# Patient Record
Sex: MALE | Race: WHITE | Employment: OTHER | URBAN - METROPOLITAN AREA
[De-identification: names, ages, dates, MRNs, and addresses within clinical notes are randomized per-mention and may not be internally consistent; named-entity substitution may affect disease eponyms.]

---

## 2019-08-15 ENCOUNTER — APPOINTMENT (OUTPATIENT)
Dept: CT IMAGING | Facility: CLINIC | Age: 64
End: 2019-08-15
Payer: MEDICARE

## 2019-08-15 ENCOUNTER — OFFICE VISIT (OUTPATIENT)
Dept: FAMILY MEDICINE | Facility: CLINIC | Age: 64
End: 2019-08-15
Payer: MEDICARE

## 2019-08-15 ENCOUNTER — APPOINTMENT (OUTPATIENT)
Dept: GENERAL RADIOLOGY | Facility: CLINIC | Age: 64
End: 2019-08-15
Payer: MEDICARE

## 2019-08-15 ENCOUNTER — ALLIED HEALTH/NURSE VISIT (OUTPATIENT)
Dept: NURSING | Facility: CLINIC | Age: 64
End: 2019-08-15
Payer: MEDICARE

## 2019-08-15 ENCOUNTER — HOSPITAL ENCOUNTER (OUTPATIENT)
Facility: CLINIC | Age: 64
Setting detail: OBSERVATION
Discharge: HOME | End: 2019-08-16
Attending: EMERGENCY MEDICINE | Admitting: SURGERY
Payer: MEDICARE

## 2019-08-15 VITALS
TEMPERATURE: 97.8 F | OXYGEN SATURATION: 98 % | SYSTOLIC BLOOD PRESSURE: 145 MMHG | HEART RATE: 52 BPM | DIASTOLIC BLOOD PRESSURE: 67 MMHG

## 2019-08-15 VITALS
OXYGEN SATURATION: 99 % | TEMPERATURE: 97.8 F | DIASTOLIC BLOOD PRESSURE: 76 MMHG | SYSTOLIC BLOOD PRESSURE: 167 MMHG | HEART RATE: 47 BPM

## 2019-08-15 DIAGNOSIS — R07.9 ACUTE CHEST PAIN: Primary | ICD-10-CM

## 2019-08-15 DIAGNOSIS — R10.9 LEFT FLANK PAIN: ICD-10-CM

## 2019-08-15 DIAGNOSIS — R58 INTRAABDOMINAL HEMORRHAGE: ICD-10-CM

## 2019-08-15 DIAGNOSIS — Z87.442 HISTORY OF KIDNEY STONES: ICD-10-CM

## 2019-08-15 DIAGNOSIS — R91.8 PULMONARY NODULES: ICD-10-CM

## 2019-08-15 DIAGNOSIS — E27.8 ADRENAL MASS (H): ICD-10-CM

## 2019-08-15 DIAGNOSIS — R07.9 CHEST PAIN: Primary | ICD-10-CM

## 2019-08-15 LAB
ALBUMIN UR-MCNC: NEGATIVE MG/DL
ANION GAP SERPL CALCULATED.3IONS-SCNC: 3 MMOL/L (ref 3–14)
APPEARANCE UR: CLEAR
BASOPHILS # BLD AUTO: 0 10E9/L (ref 0–0.2)
BASOPHILS NFR BLD AUTO: 0.1 %
BILIRUB UR QL STRIP: NEGATIVE
BUN SERPL-MCNC: 15 MG/DL (ref 7–30)
CALCIUM SERPL-MCNC: 8.6 MG/DL (ref 8.5–10.1)
CHLORIDE SERPL-SCNC: 109 MMOL/L (ref 94–109)
CO2 SERPL-SCNC: 29 MMOL/L (ref 20–32)
COLOR UR AUTO: YELLOW
CREAT SERPL-MCNC: 0.89 MG/DL (ref 0.66–1.25)
DIFFERENTIAL METHOD BLD: ABNORMAL
EOSINOPHIL # BLD AUTO: 0 10E9/L (ref 0–0.7)
EOSINOPHIL NFR BLD AUTO: 0.3 %
ERYTHROCYTE [DISTWIDTH] IN BLOOD BY AUTOMATED COUNT: 14.4 % (ref 10–15)
GFR SERPL CREATININE-BSD FRML MDRD: 89 ML/MIN/{1.73_M2}
GLUCOSE SERPL-MCNC: 108 MG/DL (ref 70–99)
GLUCOSE UR STRIP-MCNC: NEGATIVE MG/DL
HCT VFR BLD AUTO: 42.1 % (ref 40–53)
HGB BLD-MCNC: 14.2 G/DL (ref 13.3–17.7)
HGB UR QL STRIP: NEGATIVE
IMM GRANULOCYTES # BLD: 0 10E9/L (ref 0–0.4)
IMM GRANULOCYTES NFR BLD: 0.3 %
INTERPRETATION ECG - MUSE: NORMAL
KETONES UR STRIP-MCNC: 5 MG/DL
LEUKOCYTE ESTERASE UR QL STRIP: NEGATIVE
LYMPHOCYTES # BLD AUTO: 1.1 10E9/L (ref 0.8–5.3)
LYMPHOCYTES NFR BLD AUTO: 10.8 %
MCH RBC QN AUTO: 32.4 PG (ref 26.5–33)
MCHC RBC AUTO-ENTMCNC: 33.7 G/DL (ref 31.5–36.5)
MCV RBC AUTO: 96 FL (ref 78–100)
MONOCYTES # BLD AUTO: 0.6 10E9/L (ref 0–1.3)
MONOCYTES NFR BLD AUTO: 5.4 %
MUCOUS THREADS #/AREA URNS LPF: PRESENT /LPF
NEUTROPHILS # BLD AUTO: 8.7 10E9/L (ref 1.6–8.3)
NEUTROPHILS NFR BLD AUTO: 83.1 %
NITRATE UR QL: NEGATIVE
NRBC # BLD AUTO: 0 10*3/UL
NRBC BLD AUTO-RTO: 0 /100
PH UR STRIP: 6 PH (ref 5–7)
PLATELET # BLD AUTO: 233 10E9/L (ref 150–450)
POTASSIUM SERPL-SCNC: 4.2 MMOL/L (ref 3.4–5.3)
RBC # BLD AUTO: 4.38 10E12/L (ref 4.4–5.9)
RBC #/AREA URNS AUTO: <1 /HPF (ref 0–2)
SODIUM SERPL-SCNC: 141 MMOL/L (ref 133–144)
SOURCE: ABNORMAL
SP GR UR STRIP: 1.02 (ref 1–1.03)
SQUAMOUS #/AREA URNS AUTO: <1 /HPF (ref 0–1)
TROPONIN I SERPL-MCNC: <0.015 UG/L (ref 0–0.04)
UROBILINOGEN UR STRIP-MCNC: NORMAL MG/DL (ref 0–2)
WBC # BLD AUTO: 10.5 10E9/L (ref 4–11)
WBC #/AREA URNS AUTO: 1 /HPF (ref 0–5)

## 2019-08-15 PROCEDURE — 96361 HYDRATE IV INFUSION ADD-ON: CPT

## 2019-08-15 PROCEDURE — 25000131 ZZH RX MED GY IP 250 OP 636 PS 637: Performed by: EMERGENCY MEDICINE

## 2019-08-15 PROCEDURE — 25000128 H RX IP 250 OP 636: Performed by: PHYSICIAN ASSISTANT

## 2019-08-15 PROCEDURE — 76705 ECHO EXAM OF ABDOMEN: CPT

## 2019-08-15 PROCEDURE — 81001 URINALYSIS AUTO W/SCOPE: CPT | Performed by: EMERGENCY MEDICINE

## 2019-08-15 PROCEDURE — 96375 TX/PRO/DX INJ NEW DRUG ADDON: CPT | Mod: 59

## 2019-08-15 PROCEDURE — 99203 OFFICE O/P NEW LOW 30 MIN: CPT | Performed by: SURGERY

## 2019-08-15 PROCEDURE — 25000125 ZZHC RX 250: Performed by: EMERGENCY MEDICINE

## 2019-08-15 PROCEDURE — 84484 ASSAY OF TROPONIN QUANT: CPT | Performed by: EMERGENCY MEDICINE

## 2019-08-15 PROCEDURE — 99285 EMERGENCY DEPT VISIT HI MDM: CPT | Mod: 25

## 2019-08-15 PROCEDURE — 93005 ELECTROCARDIOGRAM TRACING: CPT

## 2019-08-15 PROCEDURE — 25000128 H RX IP 250 OP 636: Performed by: EMERGENCY MEDICINE

## 2019-08-15 PROCEDURE — 96374 THER/PROPH/DIAG INJ IV PUSH: CPT | Mod: 59

## 2019-08-15 PROCEDURE — 25800030 ZZH RX IP 258 OP 636: Performed by: SURGERY

## 2019-08-15 PROCEDURE — G0378 HOSPITAL OBSERVATION PER HR: HCPCS

## 2019-08-15 PROCEDURE — 99207 ZZC NO CHARGE NURSE ONLY: CPT

## 2019-08-15 PROCEDURE — 85025 COMPLETE CBC W/AUTO DIFF WBC: CPT | Performed by: EMERGENCY MEDICINE

## 2019-08-15 PROCEDURE — 74174 CTA ABD&PLVS W/CONTRAST: CPT

## 2019-08-15 PROCEDURE — 99205 OFFICE O/P NEW HI 60 MIN: CPT | Performed by: INTERNAL MEDICINE

## 2019-08-15 PROCEDURE — 93000 ELECTROCARDIOGRAM COMPLETE: CPT

## 2019-08-15 PROCEDURE — 80048 BASIC METABOLIC PNL TOTAL CA: CPT | Performed by: EMERGENCY MEDICINE

## 2019-08-15 PROCEDURE — 71046 X-RAY EXAM CHEST 2 VIEWS: CPT

## 2019-08-15 PROCEDURE — 74176 CT ABD & PELVIS W/O CONTRAST: CPT

## 2019-08-15 RX ORDER — ONDANSETRON 2 MG/ML
4 INJECTION INTRAMUSCULAR; INTRAVENOUS EVERY 6 HOURS PRN
Status: DISCONTINUED | OUTPATIENT
Start: 2019-08-15 | End: 2019-08-16 | Stop reason: HOSPADM

## 2019-08-15 RX ORDER — NALOXONE HYDROCHLORIDE 0.4 MG/ML
.1-.4 INJECTION, SOLUTION INTRAMUSCULAR; INTRAVENOUS; SUBCUTANEOUS
Status: DISCONTINUED | OUTPATIENT
Start: 2019-08-15 | End: 2019-08-16 | Stop reason: HOSPADM

## 2019-08-15 RX ORDER — IOPAMIDOL 755 MG/ML
80 INJECTION, SOLUTION INTRAVASCULAR ONCE
Status: COMPLETED | OUTPATIENT
Start: 2019-08-15 | End: 2019-08-15

## 2019-08-15 RX ORDER — DEXTROSE MONOHYDRATE, SODIUM CHLORIDE, AND POTASSIUM CHLORIDE 50; 1.49; 4.5 G/1000ML; G/1000ML; G/1000ML
INJECTION, SOLUTION INTRAVENOUS CONTINUOUS
Status: DISCONTINUED | OUTPATIENT
Start: 2019-08-15 | End: 2019-08-16 | Stop reason: HOSPADM

## 2019-08-15 RX ORDER — PROCHLORPERAZINE 25 MG
12.5 SUPPOSITORY, RECTAL RECTAL EVERY 12 HOURS PRN
Status: DISCONTINUED | OUTPATIENT
Start: 2019-08-15 | End: 2019-08-16 | Stop reason: HOSPADM

## 2019-08-15 RX ORDER — ACETAMINOPHEN 325 MG/1
650 TABLET ORAL EVERY 4 HOURS PRN
Status: DISCONTINUED | OUTPATIENT
Start: 2019-08-15 | End: 2019-08-16 | Stop reason: HOSPADM

## 2019-08-15 RX ORDER — IBUPROFEN 200 MG
400 TABLET ORAL EVERY 6 HOURS PRN
COMMUNITY

## 2019-08-15 RX ORDER — ONDANSETRON 4 MG/1
4 TABLET, ORALLY DISINTEGRATING ORAL ONCE
Status: COMPLETED | OUTPATIENT
Start: 2019-08-15 | End: 2019-08-15

## 2019-08-15 RX ORDER — ACETAMINOPHEN 650 MG/1
650 SUPPOSITORY RECTAL EVERY 4 HOURS PRN
Status: DISCONTINUED | OUTPATIENT
Start: 2019-08-15 | End: 2019-08-16 | Stop reason: HOSPADM

## 2019-08-15 RX ORDER — HYDROMORPHONE HYDROCHLORIDE 1 MG/ML
0.3 INJECTION, SOLUTION INTRAMUSCULAR; INTRAVENOUS; SUBCUTANEOUS
Status: DISCONTINUED | OUTPATIENT
Start: 2019-08-15 | End: 2019-08-16 | Stop reason: HOSPADM

## 2019-08-15 RX ORDER — OXYCODONE AND ACETAMINOPHEN 5; 325 MG/1; MG/1
1-2 TABLET ORAL EVERY 4 HOURS PRN
Status: DISCONTINUED | OUTPATIENT
Start: 2019-08-15 | End: 2019-08-16 | Stop reason: HOSPADM

## 2019-08-15 RX ORDER — KETOROLAC TROMETHAMINE 15 MG/ML
15 INJECTION, SOLUTION INTRAMUSCULAR; INTRAVENOUS ONCE
Status: COMPLETED | OUTPATIENT
Start: 2019-08-15 | End: 2019-08-15

## 2019-08-15 RX ORDER — PROCHLORPERAZINE MALEATE 5 MG
5 TABLET ORAL EVERY 6 HOURS PRN
Status: DISCONTINUED | OUTPATIENT
Start: 2019-08-15 | End: 2019-08-16 | Stop reason: HOSPADM

## 2019-08-15 RX ORDER — HYDROMORPHONE HYDROCHLORIDE 1 MG/ML
0.2 INJECTION, SOLUTION INTRAMUSCULAR; INTRAVENOUS; SUBCUTANEOUS ONCE
Status: COMPLETED | OUTPATIENT
Start: 2019-08-15 | End: 2019-08-15

## 2019-08-15 RX ORDER — ONDANSETRON 4 MG/1
4 TABLET, ORALLY DISINTEGRATING ORAL EVERY 6 HOURS PRN
Status: DISCONTINUED | OUTPATIENT
Start: 2019-08-15 | End: 2019-08-16 | Stop reason: HOSPADM

## 2019-08-15 RX ADMIN — SODIUM CHLORIDE 1000 ML: 9 INJECTION, SOLUTION INTRAVENOUS at 16:00

## 2019-08-15 RX ADMIN — SODIUM CHLORIDE 80 ML: 9 INJECTION, SOLUTION INTRAVENOUS at 16:10

## 2019-08-15 RX ADMIN — KETOROLAC TROMETHAMINE 15 MG: 15 INJECTION, SOLUTION INTRAMUSCULAR; INTRAVENOUS at 14:56

## 2019-08-15 RX ADMIN — HYDROMORPHONE HYDROCHLORIDE 0.2 MG: 1 INJECTION, SOLUTION INTRAMUSCULAR; INTRAVENOUS; SUBCUTANEOUS at 19:10

## 2019-08-15 RX ADMIN — ONDANSETRON 4 MG: 4 TABLET, ORALLY DISINTEGRATING ORAL at 13:47

## 2019-08-15 RX ADMIN — POTASSIUM CHLORIDE, DEXTROSE MONOHYDRATE AND SODIUM CHLORIDE: 150; 5; 450 INJECTION, SOLUTION INTRAVENOUS at 19:28

## 2019-08-15 RX ADMIN — IOPAMIDOL 80 ML: 755 INJECTION, SOLUTION INTRAVENOUS at 16:09

## 2019-08-15 SDOH — HEALTH STABILITY: MENTAL HEALTH: HOW MANY STANDARD DRINKS CONTAINING ALCOHOL DO YOU HAVE ON A TYPICAL DAY?: 1 OR 2

## 2019-08-15 ASSESSMENT — ENCOUNTER SYMPTOMS
FLANK PAIN: 1
FEVER: 0
COUGH: 0
DIAPHORESIS: 1
DIFFICULTY URINATING: 1
SHORTNESS OF BREATH: 0
NAUSEA: 1
HEMATURIA: 0
DYSURIA: 0

## 2019-08-15 ASSESSMENT — MIFFLIN-ST. JEOR: SCORE: 1637.21

## 2019-08-15 ASSESSMENT — PAIN SCALES - GENERAL
PAINLEVEL: MODERATE PAIN (4)
PAINLEVEL: SEVERE PAIN (6)

## 2019-08-15 NOTE — PHARMACY-ADMISSION MEDICATION HISTORY
Admission medication history interview status for the 8/15/2019  admission is complete. See EPIC admission navigator for prior to admission medications     Medication history source reliability:Good, pt interview    Actions taken by pharmacist (provider contacted, etc):modified ibuprofen dose     Additional medication history information not noted on PTA med list :None    Medication reconciliation/reorder completed by provider prior to medication history? No    Time spent in this activity: 5 minutes    Prior to Admission medications    Medication Sig Last Dose Taking? Auth Provider   ibuprofen (ADVIL/MOTRIN) 200 MG tablet Take 400 mg by mouth every 6 hours as needed for mild pain  8/14/2019 at Unknown time Yes Reported, Patient

## 2019-08-15 NOTE — ED PROVIDER NOTES
History     Chief Complaint:  Flank Pain and Chest Pain    The history is provided by the patient.      Ranulfo Duncan is a 64 year old male who presents with flank pain and chest pain. The patient states that he passed a small kidney stone this morning at around 0600. He states he had some back pain yesterday but he played 2 hours of tennis yesterday and thought it was because of that. He developed left flank pain at around 1130 while playing tennis. He had additional radiation of the pain into his left chest and diaphoresis. There is a steady pain but there is waves of increased pain. He currently has no chest pain or shortness of breath. He has been having trouble passing urine since yesterday afternoon. He was seen at urgent care and sent here for further evaluation. The patient has some nausea while at urgent care but that has passed now. He denies any hematuria, penile pain, scrotal pain, or cough. The patient lives in [place] and he flew up here 4 days ago.     Allergies:  No known drug allergies.    Medications:    The patient is not currently taking any prescribed medications.     Past Medical History:    Kidney stones    Past Surgical History:    History reviewed. No pertinent past surgical history.    Family History:    Heart disease    Social History:  Presents to ED alone  Tobacco Use: Former smoker  Alcohol Use: Yes  Drug Use: Marijuana  PCP: Physician No Ref-Primary     Review of Systems   Constitutional: Positive for diaphoresis. Negative for fever.   Respiratory: Negative for cough and shortness of breath.    Cardiovascular: Positive for chest pain.   Gastrointestinal: Positive for nausea.   Genitourinary: Positive for difficulty urinating and flank pain. Negative for dysuria, hematuria, penile pain and testicular pain.   All other systems reviewed and are negative.    Physical Exam   First Vitals:  Patient Vitals for the past 24 hrs:   BP Temp Temp src Pulse Resp SpO2 Height Weight   08/15/19 1754  "-- -- -- -- -- 98 % -- --   08/15/19 1745 -- -- -- -- -- 97 % -- --   08/15/19 1650 (!) 145/108 -- -- -- -- 96 % -- --   08/15/19 1630 (!) 160/78 -- -- (!) 40 -- -- -- --   08/15/19 1558 (!) 142/73 -- -- (!) 47 -- -- -- --   08/15/19 1543 -- -- -- -- -- 99 % -- --   08/15/19 1538 -- -- -- -- -- 96 % -- --   08/15/19 1535 (!) 145/85 -- -- -- -- -- -- --   08/15/19 1329 (!) 158/49 98  F (36.7  C) Temporal 50 16 100 % 1.803 m (5' 11\") 83 kg (183 lb)     Physical Exam  General: Alert and interactive. Appears well. Cooperative and pleasant.   Eyes: The pupils are equal and round. EOMs intact. No scleral icterus.  ENT: No abnormalities to the external nose or ears. Mucous membranes moist. Posterior oropharynx is non-erythematous.      Neck: Trachea is in the midline. No nuchal rigidity.     CV: Bradycardic rate. S1 and S2 normal without murmur, click, gallop or rub.   Resp: Breath sounds are clear bilaterally, without rhonchi, wheezes, rales. Non-labored, no retractions or accessory muscle use.     GI: Abdomen is soft without distension. No tenderness to palpation. No peritoneal signs.    MS: Moving all extremities well. Good muscle tone.   Skin: Warm and dry. No rash or lesions noted.  Neuro: Alert and oriented x 3. No focal neurologic deficits. Good strength and sensation in upper and lower extremities.    Psych: Awake. Alert.  Normal affect. Appropriate interactions.  Lymph: No anterior or posterior cervical lymphadenopathy noted.    Emergency Department Course   ECG:  @ 1347  Indication: Chest pain  Vent. Rate 89 bpm. IL interval 132 ms. QRS duration 80 ms. QT/QTc 394/479 ms. P-R-T axis 56 25 47.   Normal sinus rhythm. Normal ECG.   Read @ 1538 by Dr. Encarnacion.    Imaging:  Radiographic findings were communicated with the patient who voiced understanding of the findings.  CT abd/pelvis no contrast stone protocol:  1. Moderate acute hemorrhage at the posterior left upper quadrant.  This is in the region of the left adrenal " fossa. There are rounded  masslike opacities in this region that could represent hemorrhagic  nonspecific adrenal mass. It is difficult to exclude a vascular  etiology, for example a splenic artery hemorrhagic aneurysm. Recommend  further characterization with CT angiogram of the abdomen.  2. Bilateral nonobstructing intrarenal stones. No hydronephrosis or  ureter stone.  3. Bilateral renal cysts.  4. Indeterminant pulmonary nodules at the left lower lobe. Dominant  lesion is 0.9 cm and could be of a neoplastic etiology. Recommend  follow-up imaging and assessment with guidelines below. Per radiology read.   XR chest 2 views:  No radiographic evidence of acute chest abnormality per radiology read.  CTA abdomen pelvis w contrast:  1. Moderate acute hemorrhage, without active extravasation, in the  posterior left upper quadrant, in the region of the left adrenal  fossa. Rounded masslike opacity in this region may represent  nonspecific hemorrhagic adrenal mass.  2. Splenic artery is normal without evidence of aneurysm. Celiac  access, superior mesenteric artery, bilateral renal arteries are  patent. No evidence of aneurysm, stenosis or dissection. No abdominal  aortic aneurysm.  3. Bilateral nonobstructing renal calculi.  4. Bilateral renal cysts.  5. Indeterminate pulmonary nodules in the left lower lobe measuring up  to 0.9 cm which may be of neoplastic etiology, recommend follow-up as  below. Per radiology read.     Laboratory:  UA: Ketone 5 (A), Mucous present (A), o/w WNL  CBC:  WBC 10.5, HGB 14.2,   BMP: Glucose 108 high, o/w WNL. (Creatinine 0.89)  Troponin: <0.015    Procedures:  Results for orders placed during the hospital encounter of 08/15/19   POC US ABDOMEN LIMITED    Impression PROCEDURE: Point of Care US of Abdomen  PERFORMED BY: Dr. Yonathan Encarnacion  INDICATIONS/SYMPTOM:  Abdominal Pain.  PROBE: Low Frequency Convex probe  BODY LOCATION: The ultrasound was performed in the abdominal pelvic  regions.  FINDINGS: No evidence of free fluid in hepatorenal (Morison s pouch) or pelvic areas.  There is evidence of free fluid/blood in the splenorenal space.  The aorta is 2 cm  INTERPRETATION: The exam is abnormal.  There is evidence of free fluid in the area of the spleen, and the splenorenal space.  CT angiogram of the abdomen is pending at this time to clarify the etiology of the fluid.    IMAGE DOCUMENTATION: Images were archived to the US hard drive, and archived to the PACS system.       Interventions:  1347: Zofran 4mg PO  1456: Toradol 15mg IV  1600: NS 1L IV  1609: Isovue 80mL IV  Dilaudid 0.2 mg IV    Emergency Department Course:  2:30 PM Nursing notes and vitals reviewed.  I performed an exam of the patient as documented above.     3:25 PM I consulted with Dr. Gerber of radiology regarding the patient.    3:39 PM I staffed the patient with Dr. Encarnacion.    4:15 PM Dr. Encarnacion spoke with Dr. Osullivan, interventional radiology, in regards to the patient's history and presentation.     5:09 PM Surgery paged.     5:24 PM I consulted with Dr. Granger of general surgery regarding the patient. Dr. Granger will see the patient in the ED.    Findings and plan explained to the patient who consents to admission.   6:08 PM Dr. Granger evaluated the patient and will admit the patient to an observational bed for further monitoring, evaluation, and treatment.    Impression & Plan      Medical Decision Making:  Ranulfo Duncan is a 64 year old male otherwise healthy from [place] who presents for evaluation of left flank pain after playing tennis this weekend. He thought he passed a kidney stone earlier this morning, but had a recurrence of pain, prompting his visit here. He also had some extension up to his chest as well as some diaphoresis, and he was sent in for a cardiac rule out. CT of the abdomen and pelvis without contrast was obtain originally out of concern for stone disease, and I got a call from the radiologist saying he had a  moderate acute hemorrhage in the left upper quadrant in the region of the left adrenal fossa. He did not have any active extravasation and no signs of obstructing stones at this time. It was recommended that the patient had an angiogram of the abdomen. CTA was obtained that did show a moderate acute hemorrhage without active extravasation in the left upper quadrant. The splenic artery is normal and there is no aneurysm in any of the arteries near the kidney or adrenals. The patient also have pulmonary nodules in the left lobe which he can follow up in the outpatient setting. I discussed the case with Dr. Granger of general surgery who suggested that the patient needs outpatient workup for the adrenal mass to rule out a cancerous process. He does not need any intervention today unless his pain is out of control. I offered observation to the patient for repeat abdominal exam and possible repeat hemoglobin as needed. Dr. Granger was able to access the patient. The patient's vitals remained stable here without any signs of tachycardia or hypotension. He is bradycardic here but I think these is likely because he is in good cardiovascular shape. Dr. Granger will admit to the observation floor for pain control. He knows a good endocrine surgeon for follow up. The patient's EKG shows sinus bradycardia without troponin elevation to suggest ACS. Low likelihood that this is related to PE, given patient's adrenal mass and hemorrhage. Hemoglobin is stable. Vitals stable. Electrolytes, renal function, UA are all within normal range. He is stable for admisison.     Diagnosis:    ICD-10-CM    1. Intraabdominal hemorrhage R58    2. Adrenal mass (H) E27.9    3. Left flank pain R10.9    4. Pulmonary nodules R91.8        Disposition:  Admitted to Dr. Elkin SHETTY, Bradley Aasen, am serving as a scribe on 8/15/2019 at 2:30 PM to personally document services performed by Becca Mckeon PA-C based on my observations and the provider's statements to me.        Emergency Department Attending Supervision Note    8/15/2019  3:54 PM      I evaluated this patient in conjunction with Becca Mckeon PA-C    Brief HPI: This patient presents with acute onset of left flank pain as noted above.  The patient has been in town for a tennis tournament.      My exam:  General: Resting comfortably on the gurney  Head:  The scalp, face, and head appear normal  Eyes:  The pupils are normal    Conjunctivae and sclera appear normal  ENT:    The nose is normal    Ears/pinnae are normal  Neck:  Normal range of motion  Lungs: The lungs are clear  GI:  The abdomen is benign and non-surgical    There is some mild tenderness in the left upper quadrant    Right upper quadrant and lower abdominal quadrants are benign  Heart:  Regular rate, normal rhythm, no murmur  Skin:  No rash or acute lesions noted.  Neuro: Speech is normal and fluent.  No focal deficits.  Psych:  Awake. Alert.  Normal affect.  Appropriate interactions        Medical Decision Making:  This patient presents with left flank pain as noted above.  The patient is found on unenhanced CT scan to have a hemorrhage in the area of the adrenal gland.  CT angiography was performed to further elucidate the etiology of the hemorrhage.  This appears to be a hemorrhage from the left adrenal gland region which is already undergone tampon on and is no longer bleeding.  There appears to be an adrenal lesion in this region.  There is no evidence of intra-abdominal bleeding on CT scan or abdominal ultrasound.  The small stripe seen on ultrasound was certainly retroperitoneal.  Dr. Granger from general surgery saw the patient in the emergency department and will admit the patient overnight for observation to monitor for recurrent bleeding.  Pain control will be provided if needed.  A serial hemoglobin in the morning may be indicated.  The patient was given copies of his CT scan on a CD so that he can take this back to [facility] and see an  endocrine surgeon and endocrinologist back home for consultation work-up and further management.            My impression/diagnosis is:  Left adrenal mass  Left adrenal hemorrhage  Left retroperitoneal hemorrhage/hematoma            Yonathan Encarnacion MD, [facility], [facility]  Emergency Physician           Becca Mckeon PA-C  08/15/19 9090       Yonathan Encarnacion MD  08/15/19 3421

## 2019-08-15 NOTE — ED NOTES
"[facility]  ED Nurse Handoff Report    ED Chief complaint: Flank Pain and Chest Pain      ED Diagnosis:   Final diagnoses:   None       Code Status: To be addressed by admitting MD.     Allergies: No Known Allergies    Activity level - Baseline/Home:  Independent  Activity Level - Current:   Independent    Patient's Preferred language: English   Needed?: No    Isolation: No  Infection: Not Applicable  Bariatric?: No    Vital Signs:   Vitals:    08/15/19 1538 08/15/19 1543 08/15/19 1558 08/15/19 1630   BP:   (!) 142/73 (!) 160/78   Pulse:   (!) 47 (!) 40   Resp:       Temp:       TempSrc:       SpO2: 96% 99%     Weight:       Height:           Cardiac Rhythm: ,        Pain level: 0-10 Pain Scale: 5    Is this patient confused?: No   Does this patient have a guardian?  No         If yes, is there guardianship documents in the Epic \"Code/ACP\" activity?  N/A         Guardian Notified?  N/A  [place] - Suicide Severity Rating Scale Completed?  Yes  If yes, what color did the patient score?  White    Patient Report: Initial Complaint: Pt present to ED for flank and chest pain.   Focused Assessment: Pt is visiting [place] from [place]. Pt has a history of kidney stones. Pt states that he passed a small kidney stone this morning. While pt was playing tennis this afternoon pt experienced severe left flank pain that radiated to the chest. Pt is still having difficulty urinating in ED. Flank pain is a 4/10 in ED. Pt is alert and oriented x4. Pt is independent with all cares in the ED.   Surgery consulted.   Tests Performed: CT, US, Labs  Abnormal Results:   Treatments provided:     Family Comments: No family at the bedside     OBS brochure/video discussed/provided to patient/family: Yes              Name of person given brochure if not patient: NA              Relationship to patient: NA    ED Medications:   Medications   0.9% sodium chloride BOLUS (1,000 mLs Intravenous New Bag 8/15/19 1600) "   ondansetron (ZOFRAN-ODT) ODT tab 4 mg (4 mg Oral Given 8/15/19 1347)   ketorolac (TORADOL) injection 15 mg (15 mg Intravenous Given 8/15/19 1456)   iopamidol (ISOVUE-370) solution 80 mL (80 mLs Intravenous Given 8/15/19 1609)   sodium chloride 0.9 % bag 100mL for CT scan flush use (80 mLs Intravenous Given 8/15/19 1610)       Drips infusing?:  No    For the majority of the shift this patient was Green.   Interventions performed were NA.    Severe Sepsis OR Septic Shock Diagnosis Present: No    To be done/followed up on inpatient unit:  Monitor, Pain control     ED NURSE PHONE NUMBER: [id number]

## 2019-08-15 NOTE — ED TRIAGE NOTES
L sided flank pain. Yesterday, had slow urine, passed one kidney stone this morning, went to play tennis and Pain got severe on L side, from flank up to chest, diaphoretic.

## 2019-08-15 NOTE — CONSULTS
General Surgery [facility] Consultation/H&P    Ranulfo Duncan MRN#: [id number]   Age: 64 year old YOB: 1955     Date of Admission:          8/15/2019  Reason for consult/H&P: Left flank pain   Surgeon:      Ty Granger MD                  Chief Complaint:   Abdominal pain         History of Present Illness:   This patient is a 64 year old  male who presented to the [facility] ER with severe left flank pain.  He was playing tennis yesterday and took a break and had a small amount of left flank pain.  This resolved with hydration.  Today he played more tennis and noticed some acute onset of left flank pain.  He urinated and felt that he passed something after which his stream is stronger.  He has had a number of kidney stones before.  The pain persists now and is localized to the left flank.  It does not go to the right side and does not go up and he was chest.  Earlier today it did go up into his left side of his chest and his left shoulder.  He is not real hungry but is not certain he wants to eat solid food.. Denies fever, chills, nausea, vomiting, change in BM or urination.  The patient is visiting from [place].  He is healthy.  He does not have a regular care provider there.  He is here for a tennis tournament.  Denies having any previous episodes or abdominal surgery. History is obtained from the patient and chart.         Past Medical History:    has no past medical history on file. kidney stones          Past Surgical History:   History reviewed. No pertinent surgical history.         Medications:     Prior to Admission medications    Medication Sig Start Date End Date Taking? Authorizing Provider   ibuprofen (ADVIL/MOTRIN) 200 MG tablet Take 400 mg by mouth every 6 hours as needed for mild pain    Yes Reported, Patient            Allergies:   No Known Allergies         Social History:     Social History     Tobacco Use     Smoking status: Former Smoker     Tobacco comment: not sure  "how many years he smoked, states 3-8  per day   Substance Use Topics     Alcohol use: Yes     Alcohol/week: 4.2 - 8.4 oz     Types: 7 - 14 Cans of beer per week     Drinks per session: 1 or 2             Family History:    This patient has no significant relevant family history.  Family history is reviewed in detail.          Review of Systems:   Complete ROS is negative other than noted in the HPI.  C: NEGATIVE for fever, chills, change in weight  R: NEGATIVE for significant cough or SOB  CV: NEGATIVE for , palpitations or peripheral edema  GI:  NEGATIVE for dysuria, heartburn, or change in bowel habits  H: NEGATIVE for bleeding problems         Physical Exam:   Blood pressure (!) 145/108, pulse (!) 40, temperature 98  F (36.7  C), temperature source Temporal, resp. rate 16, height 1.803 m (5' 11\"), weight 83 kg (183 lb), SpO2 98 %.  No intake/output data recorded.    General - This is a well developed, well nourished male .  HEENT - Normocephalic. Atraumatic. Moist mucous membranes. Pupils equal.  No scleral icterus. Nose normal.  Neck - Supple without masses. No cervical adenopathy or thyromegaly  Lungs - Breathing not labored  Chest - Not tender. Left cva sore  Heart - Regular rate & rhythm   Abdomen - Soft, nontender, nondistended with +bowel sounds, no organomegaly.  Extremities - Moves all extremities. Warm without edema. Pulses noted  Neurologic - Nonfocal. Alert and oriented          Data:   Labs:  Recent Labs   Lab Test 08/15/19  1432   WBC 10.5   HGB 14.2   HCT 42.1        Recent Labs   Lab Test 08/15/19  1432   POTASSIUM 4.2   CHLORIDE 109   CO2 29   BUN 15   CR 0.89     No lab results found.  No lab results found.  Recent Labs   Lab Test 08/15/19  1432   ANDREA 8.6     Recent Labs   Lab Test 08/15/19  1432 08/15/19  1403   ANIONGAP 3  --    PROTEIN  --  Negative       CT scan of the abdomen: Images from CT and CT angiogram reviewed in detail.  Looks like a left adrenal hemorrhage.  And now measures " about 5 cm.  It is hard to say if there is a mass underlying the hemorrhage area.  There is no free blood in the abdomen.  There is some nonobstructing kidney stones.    Ultrasound of the abdomen: Not done.               Assessment:   Left adrenal hemorrhage, spontaneous.  He does not take anticoagulants.  I cannot tell if there is a adrenal mass there.  This will deserve work-up in the future.         Plan:   Hospital  overnight for pain control.  He can fly back to [place] tomorrow.  He should have evaluation with an endocrinologist or endocrine surgeon to determine if he has an adrenal lesion after the hemorrhage resolves.  We discussed this in detail.  He appears to understand the plan.       I have discussed the history, physical, and plan with the patient.   Ty Granger M.D.

## 2019-08-15 NOTE — NURSING NOTE
[facility] 64 [facility]     PRESENTING PROBLEM:  At 11:30 felt left chest pain with sweating and difficulty breathing. Chest pain is what brought him to the clinic.     Patient states that earlier this morning he had passed a kidney stone. Also played tennis for 1.5 hours.     States that earlier this morning at first he could barely urinate and then was large amount of urine with normal stream. This is his 4th kidney stone. Last one prior to this morning was in 2000.  Now left flank pain 4/10.    NURSING ASSESSMENT:  Patient complains of chest pressure/discomfort. At 11:30 was 8/10. Now down to 5/10. Much better than when he walked in.   Onset:  Kidney stone early this morning  Pain is characterized as sharp chest pain. Achey left flank pain  Severity 4-6 out of 10 now  Located left chest and left flank   Radiates to none.  Duration lasting 8/10 chest pain about 20 minutes.  Associated symptoms SOB and diaphoresis.  Exacerbated by exertion. Came on right after tennis  Relieved by rest.  Cardiac risk factors: prior smoker. Quit in 2000.  Associated Medical History: none, history of 4 kidney stones  Allergies: No Known Allergies    MEDICATIONS:  Taking medication(s) as prescribed? N/A  Taking over the counter medication(s) ?N/A  Any medication side effects? No significant side effects    Any barriers to taking medication(s) as prescribed?  N/A  Medication(s) improving/managing symptoms?  N/A  Medication reconciliation completed: Yes    Last exam/Treatment:  Patient states that he hasn't seen a doctor in 15 years.   Francine Brown RN

## 2019-08-15 NOTE — PROGRESS NOTES
RECEIVING UNIT ED HANDOFF REVIEW    ED Nurse Handoff Report was reviewed by: Grisel Everett on August 15, 2019 at 6:37 PM

## 2019-08-15 NOTE — PROGRESS NOTES
Subjective     Ranulfo Duncan is a 64 year old male who presents to clinic today for the following health issues:    HPI      Please see nursing note as well.     Ranulfo is an otherwise healthy 64-year-old male who presented as a walk-in for chest pain.  His medical history is notable for history of kidney stones.  He does not take any regular medications, hasn't gone to a doctor besides his dermatologist in [facility].  He lives in [place]; he is in town for an indoor tennis tournament at the [employer] across the [employer].  His flight home is tomorrow morning.    He played his regular matches yesterday and felt fine.  Last evening he had some trouble urinating, which he thought might of been due to being dehydrated.  He drank a lot of water.  Early this morning he developed some left sided flank pain which he has experienced before with kidney stones.  He had difficulty urinating, but then felt like he passed a small part of a stone and was able to urinate regularly.  He did not have any tennis matches this morning, so did a workout on his own.  When he was watching some of the other matches after his workout, he experienced left-sided flank pain which radiated around and up to the left side of his chest.  He also had acute significant sweating and some nausea.  He does not typically have chest pain or nausea along with his kidney stones.  He looked for the closest clinic and came over here.  The chest pain now has improved, he still has left flank/lower back pain.  At one point during the visit he was very nauseous and did vomit, and the nausea improved after that.  He has some mild lightheadedness, but denies palpitations or shortness of breath.  He has not had symptoms like this in the past.  He is very active playing tennis and has not had chest pain symptoms when playing tennis in the past.      He has no significant family history of cardiovascular disease.  His mother has some heart problems, but she developed  these in her 80s.  He has a history of smoking, but quit over 15 years ago.            Reviewed and updated as needed this visit by Provider  Problems  Med Hx  Surg Hx  Fam Hx  Soc Hx        Review of Systems   ROS COMP: Constitutional, HEENT, cardiovascular, pulmonary, GI, , musculoskeletal, neuro, skin, and psych systems are negative, except as otherwise noted.      Objective      Vitals: 145/67, HR 52, T 97.8, 98%      Physical Exam   Gen: healthy appearing pleasant gentleman. At one point in the visit quite nauseated, did vomit.  After that more comfortable.  HEENT: PERRL, sclera nonicteric, MMM  Neck: supple, no LAD   Pulm: breathing comfortably, CTAB, no wheezes or rales  CV: RRR, normal S1 and S2, no murmurs  Abd: BS present, soft, nontender, nondistended  Ext: 2+ distal pulses, no LE edema   Psych: normal affect, linear, appropriate    Diagnostic Test Results:  EKG - sinus bradycardia, no acute ST or T wave changes.         Assessment & Plan       ICD-10-CM    1. Acute chest pain R07.9    2. History of kidney stones Z87.442      Ranulfo is here with chest pain.  His pain, sweating, and nausea could all be explained by a kidney stone. However, he doesn't usually feel this way with kidney stones.  His symptoms are also concerning for ACS.  Given his presentation, age, history of smoking, and the fact that he is not from the area and flying on a long flight tomorrow, I think it is most prudent to go to the ED for a repeat EKG, monitoring, serial troponins.  If cardiac workup unremarkable, regardless would probably feel better with some IVF and antiemetics for possible kidney stone.  He declined any antiemetic medication here.  [facility] ED called and notified.        Oanh Goodman MD  [facility]

## 2019-08-16 VITALS
BODY MASS INDEX: 25.62 KG/M2 | RESPIRATION RATE: 18 BRPM | HEART RATE: 54 BPM | DIASTOLIC BLOOD PRESSURE: 56 MMHG | WEIGHT: 183 LBS | OXYGEN SATURATION: 98 % | TEMPERATURE: 96.4 F | SYSTOLIC BLOOD PRESSURE: 133 MMHG | HEIGHT: 71 IN

## 2019-08-16 LAB
ERYTHROCYTE [DISTWIDTH] IN BLOOD BY AUTOMATED COUNT: 14.8 % (ref 10–15)
HCT VFR BLD AUTO: 37.3 % (ref 40–53)
HGB BLD-MCNC: 12.4 G/DL (ref 13.3–17.7)
MCH RBC QN AUTO: 32 PG (ref 26.5–33)
MCHC RBC AUTO-ENTMCNC: 33.2 G/DL (ref 31.5–36.5)
MCV RBC AUTO: 96 FL (ref 78–100)
PLATELET # BLD AUTO: 200 10E9/L (ref 150–450)
RBC # BLD AUTO: 3.88 10E12/L (ref 4.4–5.9)
WBC # BLD AUTO: 9.6 10E9/L (ref 4–11)

## 2019-08-16 PROCEDURE — G0378 HOSPITAL OBSERVATION PER HR: HCPCS

## 2019-08-16 PROCEDURE — 25000132 ZZH RX MED GY IP 250 OP 250 PS 637: Mod: GY | Performed by: SURGERY

## 2019-08-16 PROCEDURE — 36415 COLL VENOUS BLD VENIPUNCTURE: CPT | Performed by: SURGERY

## 2019-08-16 PROCEDURE — 85027 COMPLETE CBC AUTOMATED: CPT | Performed by: SURGERY

## 2019-08-16 PROCEDURE — 25800030 ZZH RX IP 258 OP 636: Performed by: SURGERY

## 2019-08-16 PROCEDURE — 96361 HYDRATE IV INFUSION ADD-ON: CPT

## 2019-08-16 RX ORDER — OXYCODONE AND ACETAMINOPHEN 5; 325 MG/1; MG/1
1-2 TABLET ORAL EVERY 4 HOURS PRN
Qty: 10 TABLET | Refills: 0 | Status: SHIPPED | OUTPATIENT
Start: 2019-08-16

## 2019-08-16 RX ADMIN — POTASSIUM CHLORIDE, DEXTROSE MONOHYDRATE AND SODIUM CHLORIDE: 150; 5; 450 INJECTION, SOLUTION INTRAVENOUS at 04:30

## 2019-08-16 RX ADMIN — OXYCODONE HYDROCHLORIDE AND ACETAMINOPHEN 1 TABLET: 5; 325 TABLET ORAL at 04:19

## 2019-08-16 NOTE — PLAN OF CARE
OBS GOALS    -diagnostic tests and consults completed and resulted: NOT MET, CBC in AM  -vital signs normal or at patient baseline: MET

## 2019-08-16 NOTE — PROGRESS NOTES
Observation goals PRIOR TO DISCHARGE     Comments:   -diagnostic tests and consults completed and resulted: Met  -vital signs normal or at patient baseline: Met  Nurse to notify provider when observation goals have been met and patient is ready for discharge.

## 2019-08-16 NOTE — PROGRESS NOTES
Afebrile, alert. Hgb down slightly. I think he can go home. Gave name of local endocrine group near his home.

## 2019-08-16 NOTE — PLAN OF CARE
Arrived to unit 1900. A&Ox4. VSS on RA. CT and CTA ab pelvis complete (see results). L flank pain controlled w/ dilaudid x1. Up ind. Yet to void since arrival to OBS, denies increased pain w/ urination or hematuria. [place] reg diet, denies nausea. IVF infusing. Abdomen soft, BS+. Dr Granger (gen surgery) ok w/ pt flying back to [place] tomorrow pending pan control (see note) and  should follow up w/ endocrinologist at home (see note). CBC in AM. Continue to monitor.

## 2019-08-16 NOTE — PLAN OF CARE
Up IND. A/ox4. VSS RA. L flank pain managed w/prn percocet x1. BS+, passing gas. Voiding in BR. Denies any pain during urination. Tolerating reg diet. Pt concerned about flight back to [place] today. Will consult w/md today about possible discharge time, and follow up w/endocrinologist back home if pain managed well.

## 2019-08-16 NOTE — PLAN OF CARE
RN:  Patient  discharged to home.  A/O x4.  VSS.  Cleared by Dr. Granger to fly home to [place].  Patient instructed to follow-up with an endocrinologist in his home state.  Patient to discharge with filled script of percocet.    Patient up IND.  Tolerating diet.  Voiding.    Patient gave good understanding of treatment plan.  AVS provided to the patient.  Patient filled out a release of medical information form prior to discharge.

## 2019-08-27 NOTE — DISCHARGE SUMMARY
"[facility] Discharge Summary    Ranulfo Duncan MRN# [id number]   Age: 64 year old YOB: 1955     Date of Admission:  8/15/2019  Date of Discharge:  8/16  Admitting Provider:  Ty Granger MD  Discharge Provider:  Dr. Ty Granger  Discharging Service: General Surgery     Primary Provider: Shantell Ref-Primary, Physician  Primary Care Physician Phone Number: None          Admission Diagnoses:   Principle Diagnosis: Adrenal mass (H) [E27.9]  Left flank pain [R10.9]  Intraabdominal hemorrhage [R58]  Secondary Diagnoses:          Discharge Diagnosis:   same          Procedures:   Procedure(s): none            Discharge Medications:     Discharge Medication List as of 8/16/2019 10:00 AM      START taking these medications    Details   oxyCODONE-acetaminophen (PERCOCET) 5-325 MG tablet Take 1-2 tablets by mouth every 4 hours as needed for moderate to severe pain, Disp-10 tablet, R-0, Local Print         CONTINUE these medications which have NOT CHANGED    Details   ibuprofen (ADVIL/MOTRIN) 200 MG tablet Take 400 mg by mouth every 6 hours as needed for mild pain , Historical                 Allergies:       No Known Allergies          Brief History of Illness:   He presented with acute flank pain. It appeared to be from a spontaneous left adrenal hemorrhage.           Hospital Course:   Ranulfo Duncan's hospital course was unremarkable.   On the date of discharge, the patient was discharged to home in stable condition and afebrile.  He verbalized understanding of all discharge instructions and felt comfortable with the discharge plan.  He was asked to call with any further questions or concerns.         Condition on Discharge:      Discharge condition: Stable   Discharge vitals: Blood pressure 133/56, pulse 54, temperature 96.4  F (35.8  C), temperature source Oral, resp. rate 18, height 1.803 m (5' 11\"), weight 83 kg (183 lb), SpO2 98 %.           Discharge Disposition:   Discharged to home          " Discharge Instructions and Follow-Up:      Rnaulfo Duncan was asked to follow up with an endocrinologist or endocrine surgery team in 1-2 weeks.      Dr. Ty Granger  [phone]